# Patient Record
Sex: FEMALE | Race: BLACK OR AFRICAN AMERICAN | URBAN - METROPOLITAN AREA
[De-identification: names, ages, dates, MRNs, and addresses within clinical notes are randomized per-mention and may not be internally consistent; named-entity substitution may affect disease eponyms.]

---

## 2019-11-07 ENCOUNTER — OFFICE VISIT (OUTPATIENT)
Dept: FAMILY MEDICINE CLINIC | Age: 54
End: 2019-11-07

## 2019-11-07 VITALS
TEMPERATURE: 97.8 F | DIASTOLIC BLOOD PRESSURE: 85 MMHG | BODY MASS INDEX: 22.2 KG/M2 | HEIGHT: 64 IN | HEART RATE: 96 BPM | OXYGEN SATURATION: 99 % | SYSTOLIC BLOOD PRESSURE: 128 MMHG | RESPIRATION RATE: 14 BRPM | WEIGHT: 130 LBS

## 2019-11-07 DIAGNOSIS — H69.83 EUSTACHIAN TUBE DYSFUNCTION, BILATERAL: ICD-10-CM

## 2019-11-07 DIAGNOSIS — L21.9 SEBORRHEIC DERMATITIS OF SCALP: ICD-10-CM

## 2019-11-07 DIAGNOSIS — J31.0 RHINITIS, UNSPECIFIED TYPE: ICD-10-CM

## 2019-11-07 DIAGNOSIS — I10 ESSENTIAL HYPERTENSION: Primary | ICD-10-CM

## 2019-11-07 DIAGNOSIS — E89.0 POSTABLATIVE HYPOTHYROIDISM: ICD-10-CM

## 2019-11-07 RX ORDER — LEVOTHYROXINE SODIUM 75 UG/1
TABLET ORAL
COMMUNITY
End: 2019-11-07 | Stop reason: SDUPTHER

## 2019-11-07 RX ORDER — LEVOTHYROXINE SODIUM 88 UG/1
88 TABLET ORAL
Qty: 30 TAB | Refills: 0 | Status: SHIPPED | OUTPATIENT
Start: 2019-11-07 | End: 2019-12-12 | Stop reason: SDUPTHER

## 2019-11-07 RX ORDER — LEVOTHYROXINE SODIUM 88 UG/1
88 TABLET ORAL
COMMUNITY
End: 2019-11-07 | Stop reason: SDUPTHER

## 2019-11-07 RX ORDER — FLUTICASONE PROPIONATE 50 MCG
2 SPRAY, SUSPENSION (ML) NASAL DAILY
Qty: 1 BOTTLE | Refills: 3 | Status: SHIPPED | OUTPATIENT
Start: 2019-11-07 | End: 2019-12-12 | Stop reason: SDUPTHER

## 2019-11-07 RX ORDER — AMLODIPINE BESYLATE AND BENAZEPRIL HYDROCHLORIDE 10; 40 MG/1; MG/1
1 CAPSULE ORAL DAILY
Qty: 30 CAP | Refills: 5 | Status: SHIPPED | OUTPATIENT
Start: 2019-11-07 | End: 2019-12-12 | Stop reason: SDUPTHER

## 2019-11-07 RX ORDER — LEVOTHYROXINE SODIUM 75 UG/1
75 TABLET ORAL
Qty: 30 TAB | Refills: 0 | Status: SHIPPED | OUTPATIENT
Start: 2019-11-07 | End: 2019-12-12 | Stop reason: SDUPTHER

## 2019-11-07 RX ORDER — AMLODIPINE BESYLATE AND BENAZEPRIL HYDROCHLORIDE 10; 40 MG/1; MG/1
1 CAPSULE ORAL DAILY
COMMUNITY
End: 2019-11-07 | Stop reason: SDUPTHER

## 2019-11-07 NOTE — PROGRESS NOTES
1. Have you been to the ER, urgent care clinic since your last visit? Hospitalized since your last visit? No    2. Have you seen or consulted any other health care providers outside of the [address] [place] [address] since your last visit? Include any pap smears or colon screening.  No

## 2019-11-07 NOTE — PROGRESS NOTES
[facility]    CC: EOC    HPI:     HTN:  -Taking BP medication as prescribed  -Denies any side effects or issue  -No currently checking BP at home  -Not following any regular exercise regimen  -Diet is unrestricted      Hypothyroidism:  -Reports it is difficult to control  -Taking levothyroxine as prescribed  -Denies any side effects to medication      Bilateral Ear Discomfort:  -Issue started 3 years ago  -Thinks some thing got into her ear  -Associated with chronic ear infections and headaches      ROS: Positive items marked in RED  CON: fever, chills  Cardiovascular: palpitations, CP  Resp: SOB, cough  GI: nausea, vomiting, diarrhea  : dysuria, hematuria      Past Medical History:   Diagnosis Date    Hypertension     Hyperthyroidism     Hypothyroidism        Past Surgical History:   Procedure Laterality Date    HX HYSTERECTOMY  2010       Family History   Problem Relation Age of Onset    Hypertension Mother     Heart Disease Father        Social History     Socioeconomic History    Marital status:      Spouse name: Not on file    Number of children: Not on file    Years of education: Not on file    Highest education level: Not on file   Tobacco Use    Smoking status: Current Some Day Smoker     Types: Cigars    Smokeless tobacco: Never Used   Substance and Sexual Activity    Alcohol use: Yes     Alcohol/week: 1.0 standard drinks     Types: 1 Glasses of wine per week     Comment: ocasional    Drug use: Never    Sexual activity: Yes     Partners: Male     Birth control/protection: None       Allergies   Allergen Reactions    Peanuts [Peanut Oil] Anaphylaxis         Current Outpatient Medications:     amLODIPine-benazepril (LOTREL) 10-40 mg per capsule, Take 1 Cap by mouth daily. , Disp: , Rfl:     levothyroxine (SYNTHROID) 75 mcg tablet, Take  by mouth Daily (before breakfast).  Monday thru Friday, Disp: , Rfl:     levothyroxine (SYNTHROID) 88 mcg tablet, Take 88 mcg by mouth Daily (before breakfast).  Saturday and Sunday, Disp: , Rfl:     Physical Exam:      /85   Pulse 96   Temp 97.8 °F (36.6 °C) (Oral)   Resp 14   Ht 5' 4\" (1.626 m)   Wt 130 lb (59 kg)   SpO2 99%   BMI 22.31 kg/m²     General:  WD, WN, NAD, conversant  Eyes: sclera clear bilaterally, no discharge noted, eyelids normal in appearance  HENT: NCAT, TMs slightly retracted bilaterally, nasal turbinates enlarged bilaterally, oropharynx clear, MMM  Lungs: CTAB, normal respiratory effort and rate  CV: RRR, no MRGs  ABD: soft, non-tender, non-distended, normal bowel sounds  Skin: patches of skin on scalp with oily brown scaling/flakes  Psych: alert and oriented to person, place and situation, normal affect  Neuro: speech normal, moving all extremities, gait normal      Assessment/Plan     HTN:  -Goal BP unclear  -Will continue current BP medication regimen  -CBC, CMP, and fasting lipid panel  -F/U in one month      Hypothyroidism:  -Will continue current levothyroxine regimen  -TSH ordered  -F/U in one month      Seborrheic Dermatitis of Scalp:  -Counseled on diagnosis  -Started on pyrithione shampoo regimen  -Handout given on seborrheic dermatitis care  -F/U in one month      Bilateral Eustachian Tube Dysfunction:  -2/2 rhinitis (Suspect allergic etiology)  -Counseled on diagnosis and recommended care  -Started on Flonase regimen  -Allergen profile ordered  -Handouts given on rhinitis care and eustachian tube dysfunction care  -F/U in one month        Rigoberto Gamboa MD  11/7/2019, 1:52 PM

## 2019-11-07 NOTE — PATIENT INSTRUCTIONS
Eustachian Tube Problems: Care Instructions  Your Care Instructions    The eustachian (say \"you-STAY-shee-un\") tubes run between the inside of the ears and the throat. They keep air pressure stable in the ears. If your eustachian tubes become blocked, the air pressure in your ears changes. The fluids from a cold can clog eustachian tubes, causing pain in the ears. A quick change in air pressure can cause eustachian tubes to close up. This might happen when an airplane changes altitude or when a  goes up or down underwater. Eustachian tube problems often clear up on their own or after antibiotic treatment. If your tubes continue to be blocked, you may need surgery. Follow-up care is a key part of your treatment and safety. Be sure to make and go to all appointments, and call your doctor if you are having problems. It's also a good idea to know your test results and keep a list of the medicines you take. How can you care for yourself at home? · To ease ear pain, apply a warm washcloth or a heating pad set on low. There may be some drainage from the ear when the heat melts earwax. Put a cloth between the heat source and your skin. Do not use a heating pad with children. · If your doctor prescribed antibiotics, take them as directed. Do not stop taking them just because you feel better. You need to take the full course of antibiotics. · Your doctor may recommend over-the-counter medicine. Be safe with medicines. Oral or nasal decongestants may relieve ear pain. Avoid decongestants that are combined with antihistamines, which tend to cause more blockage. But if allergies seem to be the problem, your doctor may recommend a combination. Be careful with cough and cold medicines. Don't give them to children younger than 6, because they don't work for children that age and can even be harmful. For children 6 and older, always follow all the instructions carefully.  Make sure you know how much medicine to give and how long to use it. And use the dosing device if one is included. When should you call for help? Call your doctor now or seek immediate medical care if:    · You develop sudden, complete hearing loss.     · You have severe pain or feel dizzy.     · You have new or increasing pus or blood draining from your ear.     · You have redness, swelling, or pain around or behind the ear.    Watch closely for changes in your health, and be sure to contact your doctor if:    · You do not get better after 2 weeks.     · You have any new symptoms, such as itching or a feeling of fullness in the ear. Where can you learn more? Go to http://chikis-savanna.info/. Enter Y822 in the search box to learn more about \"Eustachian Tube Problems: Care Instructions. \"  Current as of: October 21, 2018  Content Version: 12.2  © 7158-1389 [employer]. Care instructions adapted under license by [facility] (which disclaims liability or warranty for this information). If you have questions about a medical condition or this instruction, always ask your healthcare professional. [place] [address] any warranty or liability for your use of this information. Rhinitis: Care Instructions  Your Care Instructions  Rhinitis is swelling and irritation in the nose. Allergies and infections are often the cause. Your nose may run or feel stuffy. Other symptoms are itchy and sore eyes, ears, throat, and mouth. If allergies are the cause, your doctor may do tests to find out what you are allergic to. You may be able to stop symptoms if you avoid the things that cause them. Your doctor may suggest or prescribe medicine to ease your symptoms. Follow-up care is a key part of your treatment and safety. Be sure to make and go to all appointments, and call your doctor if you are having problems. It's also a good idea to know your test results and keep a list of the medicines you take.   How can you care for yourself at home? · If your rhinitis is caused by allergies, try to find out what sets off (triggers) your symptoms. Take steps to avoid these triggers. ? Avoid yard work. It can stir up both pollen and mold. ? Do not smoke or allow others to smoke around you. If you need help quitting, talk to your doctor about stop-smoking programs and medicines. These can increase your chances of quitting for good. ? Do not use aerosol sprays, cleaning products, or perfumes. ? If pollen is one of your triggers, close your house and car windows during blooming season. ? Clean your house often to control dust.  ? Keep pets outside. · If your doctor recommends over-the-counter medicines to relieve symptoms, take your medicines exactly as prescribed. Call your doctor if you think you are having a problem with your medicine. · Use saline (saltwater) nasal washes to help keep your nasal passages open and wash out mucus and bacteria. You can buy saline nose drops at a grocery store or drugstore. Or you can make your own at home by adding 1 teaspoon of salt and 1 teaspoon of baking soda to 2 cups of distilled water. If you make your own, fill a bulb syringe with the solution, insert the tip into your nostril, and squeeze gently. Linda Lock your nose. When should you call for help? Call your doctor now or seek immediate medical care if:    · You are having trouble breathing.    Watch closely for changes in your health, and be sure to contact your doctor if:    · Mucus from your nose gets thicker (like pus) or has new blood in it.     · You have new or worse symptoms.     · You do not get better as expected. Where can you learn more? Go to http://chikis-savanna.info/. Enter M030 in the search box to learn more about \"Rhinitis: Care Instructions. \"  Current as of: October 21, 2018  Content Version: 12.2  © 9019-7155 [employer].  Care instructions adapted under license by [facility] [employer] (which disclaims liability or warranty for this information). If you have questions about a medical condition or this instruction, always ask your healthcare professional. [place] any warranty or liability for your use of this information. Seborrheic Dermatitis: Care Instructions  Your Care Instructions  Seborrheic dermatitis (say \"rjs-var-WLV-ick hdo-tly-LN-tus\") is a skin problem that causes a reddish rash with greasy, flaky, yellow skin patches. The rash may appear on many parts of the body. It may be on the scalp, face (especially the eyebrow area and between the nose and mouth), ears, breasts, underarms, and genital area. The flaky skin on the scalp is called dandruff. This rash is often a long-term (chronic) condition. It may last for years. But the symptoms may come and go. Symptoms can be treated with special creams, shampoos, or other skin care. The cause of seborrheic dermatitis is not fully understood. It may occur when skin glands make too much oil. It may get worse in cold weather or with stress. A type of skin fungus, or yeast, may also be linked with this condition. Follow-up care is a key part of your treatment and safety. Be sure to make and go to all appointments, and call your doctor if you are having problems. It's also a good idea to know your test results and keep a list of the medicines you take. How can you care for yourself at home? · If your doctor prescribes a steroid cream, dandruff shampoo, or antifungal cream or medicine, use it as directed. If your doctor prescribes other medicine, take it as directed. · Use a dandruff shampoo if seborrheic dermatitis affects your scalp. This includes Head & Shoulders, Sebulex, and Selsun Blue. You may need to try a few kinds of shampoo to find the one that works best for you. · To help with itching:  ? Use hydrocortisone cream. Follow the directions on the label. ? Use cold, wet cloths.   ? Take an over-the-counter antihistamine, such as diphenhydramine (Benadryl) or loratadine (Claritin). Read and follow all instructions on the label. When should you call for help? Call your doctor now or seek immediate medical care if:    · You have signs of infection, such as:  ? Increased pain, swelling, warmth, or redness. ? Red streaks leading from the rash. ? Pus draining from the rash. ? A fever.    Watch closely for changes in your health, and be sure to contact your doctor if:    · The rash gets worse or spreads to other parts of your body.     · You do not get better as expected. Where can you learn more? Go to http://chikis-savanna.info/. Enter N007 in the search box to learn more about \"Seborrheic Dermatitis: Care Instructions. \"  Current as of: April 1, 2019  Content Version: 12.2  © 6373-4578 [employer]. Care instructions adapted under license by [employer] (which disclaims liability or warranty for this information). If you have questions about a medical condition or this instruction, always ask your healthcare professional. [place] [address] any warranty or liability for your use of this information.

## 2019-11-20 DIAGNOSIS — L21.9 SEBORRHEIC DERMATITIS OF SCALP: Primary | ICD-10-CM

## 2019-11-20 RX ORDER — CICLOPIROX 1 G/100ML
SHAMPOO TOPICAL
Qty: 120 ML | Refills: 0 | Status: SHIPPED | OUTPATIENT
Start: 2019-11-20 | End: 2019-12-12 | Stop reason: SDUPTHER

## 2019-11-25 PROBLEM — E89.0 POSTABLATIVE HYPOTHYROIDISM: Status: ACTIVE | Noted: 2019-11-25

## 2019-12-08 LAB
A ALTERNATA IGE QN: <0.1 KU/L
A FUMIGATUS IGE QN: <0.1 KU/L
ALBUMIN SERPL-MCNC: 4.7 G/DL (ref 3.5–5.5)
ALBUMIN/GLOB SERPL: 1.9 {RATIO} (ref 1.2–2.2)
ALP SERPL-CCNC: 92 IU/L (ref 39–117)
ALT SERPL-CCNC: 13 IU/L (ref 0–32)
AMER ROACH IGE QN: <0.1 KU/L
AST SERPL-CCNC: 20 IU/L (ref 0–40)
BAHIA GRASS IGE QN: <0.1 KU/L
BASOPHILS # BLD AUTO: 0 X10E3/UL (ref 0–0.2)
BASOPHILS NFR BLD AUTO: 0 %
BERMUDA GRASS IGE QN: <0.1 KU/L
BILIRUB SERPL-MCNC: <0.2 MG/DL (ref 0–1.2)
BOXELDER IGE QN: <0.1 KU/L
BUN SERPL-MCNC: 9 MG/DL (ref 6–24)
BUN/CREAT SERPL: 14 (ref 9–23)
C HERBARUM IGE QN: <0.1 KU/L
CALCIUM SERPL-MCNC: 10 MG/DL (ref 8.7–10.2)
CAT DANDER IGE QN: <0.1 KU/L
CHLORIDE SERPL-SCNC: 101 MMOL/L (ref 96–106)
CHOLEST SERPL-MCNC: 200 MG/DL (ref 100–199)
CMN PIGWEED IGE QN: <0.1 KU/L
CO2 SERPL-SCNC: 25 MMOL/L (ref 20–29)
COMMON RAGWEED IGE QN: <0.1 KU/L
CREAT SERPL-MCNC: 0.63 MG/DL (ref 0.57–1)
D FARINAE IGE QN: <0.1 KU/L
D PTERONYSS IGE QN: <0.1 KU/L
DOG DANDER IGE QN: 0.11 KU/L
ENGL PLANTAIN IGE QN: <0.1 KU/L
EOSINOPHIL # BLD AUTO: 0.2 X10E3/UL (ref 0–0.4)
EOSINOPHIL NFR BLD AUTO: 2 %
ERYTHROCYTE [DISTWIDTH] IN BLOOD BY AUTOMATED COUNT: 13.8 % (ref 12.3–15.4)
GLOBULIN SER CALC-MCNC: 2.5 G/DL (ref 1.5–4.5)
GLUCOSE SERPL-MCNC: 86 MG/DL (ref 65–99)
HCT VFR BLD AUTO: 39.7 % (ref 34–46.6)
HDLC SERPL-MCNC: 68 MG/DL
HGB BLD-MCNC: 13.7 G/DL (ref 11.1–15.9)
IMM GRANULOCYTES # BLD AUTO: 0 X10E3/UL (ref 0–0.1)
IMM GRANULOCYTES NFR BLD AUTO: 0 %
INTERPRETATION, 910389: NORMAL
JOHNSON GRASS IGE QN: <0.1 KU/L
LDLC SERPL CALC-MCNC: 108 MG/DL (ref 0–99)
LONDON PLANE IGE QN: <0.1 KU/L
LYMPHOCYTES # BLD AUTO: 3.4 X10E3/UL (ref 0.7–3.1)
LYMPHOCYTES NFR BLD AUTO: 30 %
Lab: ABNORMAL
M RACEMOSUS IGE QN: <0.1 KU/L
MCH RBC QN AUTO: 33.2 PG (ref 26.6–33)
MCHC RBC AUTO-ENTMCNC: 34.5 G/DL (ref 31.5–35.7)
MCV RBC AUTO: 96 FL (ref 79–97)
MONOCYTES # BLD AUTO: 0.5 X10E3/UL (ref 0.1–0.9)
MONOCYTES NFR BLD AUTO: 5 %
MT JUNIPER IGE QN: <0.1 KU/L
MUGWORT IGE QN: <0.1 KU/L
NETTLE IGE QN: <0.1 KU/L
NEUTROPHILS # BLD AUTO: 7.1 X10E3/UL (ref 1.4–7)
NEUTROPHILS NFR BLD AUTO: 63 %
P NOTATUM IGE QN: <0.1 KU/L
PLATELET # BLD AUTO: 497 X10E3/UL (ref 150–450)
POTASSIUM SERPL-SCNC: 4.2 MMOL/L (ref 3.5–5.2)
PROT SERPL-MCNC: 7.2 G/DL (ref 6–8.5)
RBC # BLD AUTO: 4.13 X10E6/UL (ref 3.77–5.28)
S BOTRYOSUM IGE QN: <0.1 KU/L
SHEEP SORREL IGE QN: <0.1 KU/L
SILVER BIRCH IGE QN: <0.1 KU/L
SODIUM SERPL-SCNC: 144 MMOL/L (ref 134–144)
SWEET GUM IGE QN: <0.1 KU/L
TIMOTHY IGE QN: <0.1 KU/L
TRIGL SERPL-MCNC: 119 MG/DL (ref 0–149)
TSH SERPL DL<=0.005 MIU/L-ACNC: 0.48 UIU/ML (ref 0.45–4.5)
VLDLC SERPL CALC-MCNC: 24 MG/DL (ref 5–40)
WBC # BLD AUTO: 11.2 X10E3/UL (ref 3.4–10.8)
WHITE ELM IGE QN: <0.1 KU/L
WHITE HICKORY IGE QN: <0.1 KU/L
WHITE MULBERRY IGE QN: <0.1 KU/L
WHITE OAK IGE QN: <0.1 KU/L

## 2019-12-12 ENCOUNTER — OFFICE VISIT (OUTPATIENT)
Dept: FAMILY MEDICINE CLINIC | Age: 54
End: 2019-12-12

## 2019-12-12 VITALS
HEIGHT: 64 IN | WEIGHT: 131 LBS | TEMPERATURE: 97.9 F | DIASTOLIC BLOOD PRESSURE: 86 MMHG | OXYGEN SATURATION: 99 % | SYSTOLIC BLOOD PRESSURE: 134 MMHG | BODY MASS INDEX: 22.36 KG/M2 | HEART RATE: 74 BPM | RESPIRATION RATE: 16 BRPM

## 2019-12-12 DIAGNOSIS — H69.83 EUSTACHIAN TUBE DYSFUNCTION, BILATERAL: ICD-10-CM

## 2019-12-12 DIAGNOSIS — L21.9 SEBORRHEIC DERMATITIS OF SCALP: ICD-10-CM

## 2019-12-12 DIAGNOSIS — E78.5 HYPERLIPIDEMIA, UNSPECIFIED HYPERLIPIDEMIA TYPE: ICD-10-CM

## 2019-12-12 DIAGNOSIS — J30.89 NON-SEASONAL ALLERGIC RHINITIS, UNSPECIFIED TRIGGER: ICD-10-CM

## 2019-12-12 DIAGNOSIS — I10 HYPERTENSION WITH GOAL BLOOD PRESSURE LESS THAN 140/90: Primary | ICD-10-CM

## 2019-12-12 DIAGNOSIS — E89.0 POSTABLATIVE HYPOTHYROIDISM: ICD-10-CM

## 2019-12-12 RX ORDER — MINERAL OIL
180 ENEMA (ML) RECTAL DAILY
Qty: 90 TAB | Refills: 2 | Status: SHIPPED | OUTPATIENT
Start: 2019-12-12 | End: 2020-01-27 | Stop reason: ALTCHOICE

## 2019-12-12 RX ORDER — LEVOTHYROXINE SODIUM 88 UG/1
88 TABLET ORAL
Qty: 90 TAB | Refills: 1 | Status: SHIPPED | OUTPATIENT
Start: 2019-12-12 | End: 2020-09-25 | Stop reason: SDUPTHER

## 2019-12-12 RX ORDER — LEVOTHYROXINE SODIUM 75 UG/1
75 TABLET ORAL
Qty: 90 TAB | Refills: 1 | Status: SHIPPED | OUTPATIENT
Start: 2019-12-12 | End: 2020-09-25 | Stop reason: SDUPTHER

## 2019-12-12 RX ORDER — FLUTICASONE PROPIONATE 50 MCG
2 SPRAY, SUSPENSION (ML) NASAL DAILY
Qty: 1 BOTTLE | Refills: 3 | Status: SHIPPED | OUTPATIENT
Start: 2019-12-12 | End: 2020-11-17 | Stop reason: SDUPTHER

## 2019-12-12 RX ORDER — CICLOPIROX 1 G/100ML
SHAMPOO TOPICAL
Qty: 120 ML | Refills: 5 | Status: SHIPPED | OUTPATIENT
Start: 2019-12-12 | End: 2020-11-17 | Stop reason: SDUPTHER

## 2019-12-12 RX ORDER — AMLODIPINE BESYLATE AND BENAZEPRIL HYDROCHLORIDE 10; 40 MG/1; MG/1
1 CAPSULE ORAL DAILY
Qty: 90 CAP | Refills: 2 | Status: SHIPPED | OUTPATIENT
Start: 2019-12-12 | End: 2020-11-17 | Stop reason: SDUPTHER

## 2019-12-12 NOTE — PROGRESS NOTES
[facility]    CC: Follow-up for chronic disease management    HPI:     HTN:  -Got requested lab work  -Taking BP medication as prescribed  -Denies any side effects or issues with BP medication  -Not following a regular exercise regimen  -Diet is unchanged since prior visit       Hypothyroidism:  -Got requested lab work  -Taking Synthroid as prescribed  -Denies any side effects or issues with the medication       Seborrheic Dermatitis of Scalp:  -Declined to use pyrithione shampoo, so she was started on ciclopirox shampoo regimen  -Has been using ciclopirox shampoo as prescribed  -Denies any side effects or issues with pressure improved        Bilateral Eustachian Tube Dysfunction:  -Got requested lab work  -Continues to endorse odd sensation in mastoids that is distressing for her  -Has been using Flonase as prescribed  -Denies any side effects or issues with the medication      ROS: Positive items marked in RED  CON: fever, chills  Cardiovascular: palpitations, CP  Resp: SOB, cough  GI: nausea, vomiting, diarrhea  : dysuria, hematuria      Past Medical History:   Diagnosis Date    Hypertension     Hyperthyroidism     Hypothyroidism        Past Surgical History:   Procedure Laterality Date    HX HYSTERECTOMY  2010       Family History   Problem Relation Age of Onset    Hypertension Mother     Heart Disease Father        Social History     Socioeconomic History    Marital status:      Spouse name: Not on file    Number of children: Not on file    Years of education: Not on file    Highest education level: Not on file   Tobacco Use    Smoking status: Current Some Day Smoker     Types: Cigars    Smokeless tobacco: Never Used   Substance and Sexual Activity    Alcohol use:  Yes     Alcohol/week: 1.0 standard drinks     Types: 1 Glasses of wine per week     Comment: ocasional    Drug use: Never    Sexual activity: Yes     Partners: Male     Birth control/protection: None Allergies   Allergen Reactions    Peanuts [Peanut Oil] Anaphylaxis         Current Outpatient Medications:     amLODIPine-benazepril (LOTREL) 10-40 mg per capsule, Take 1 Cap by mouth daily. , Disp: 30 Cap, Rfl: 5    levothyroxine (SYNTHROID) 75 mcg tablet, Take 1 Tab by mouth Daily (before breakfast). Monday thru Friday, Disp: 30 Tab, Rfl: 0    levothyroxine (SYNTHROID) 88 mcg tablet, Take 1 Tab by mouth Daily (before breakfast). Saturday and Sunday, Disp: 30 Tab, Rfl: 0    fluticasone propionate (FLONASE) 50 mcg/actuation nasal spray, 2 Sprays by Both Nostrils route daily. , Disp: 1 Bottle, Rfl: 3    pyrithione zinc 1 % shampoo, Wash scalp twice per week  Indications: seborrheic dermatitis, Disp: 400 mL, Rfl: 2    ciclopirox (LOPROX) 1 % sham, Apply 5 mL to wet hair; lather, and leave on hair and scalp for 3 minutes; rinse. May use 10 mL for longer hair. Repeat twice weekly for 4 weeks; allow a minimum of 3 days between applications, Disp: 980 mL, Rfl: 0    Physical Exam:      /86   Pulse 74   Temp 97.9 °F (36.6 °C) (Oral)   Resp 16   Ht 5' 4\" (1.626 m)   Wt 131 lb (59.4 kg)   SpO2 99%   BMI 22.49 kg/m²     General:  WD, WN, NAD, conversant  Eyes: sclera clear bilaterally, no discharge noted, eyelids normal in appearance  HENT: NCAT, nasal turbinates enlarged bilaterally (issue appears to be mostly unchanged from prior visit)  Lungs: CTAB, normal respiratory effort and rate  CV: RRR, no MRGs  ABD: soft, non-tender, non-distended, normal bowel sounds  Skin: patches of skin on scalp with oily brown scaling/flakes are no longer present  Psych: alert and oriented to person, place and situation, normal affect  Neuro: speech normal, moving all extremities, gait normal    Results for Rick Bowman (MRN <[id number]>):   Ref.  Range 12/5/2019 09:13   WBC Latest Ref Range: 3.4 - 10.8 x10E3/uL 11.2 (H)   RBC Latest Ref Range: 3.77 - 5.28 x10E6/uL 4.13   HGB Latest Ref Range: 11.1 - 15.9 g/dL 13.7 HCT Latest Ref Range: 34.0 - 46.6 % 39.7   MCV Latest Ref Range: 79 - 97 fL 96   MCH Latest Ref Range: 26.6 - 33.0 pg 33.2 (H)   MCHC Latest Ref Range: 31.5 - 35.7 g/dL 34.5   RDW Latest Ref Range: 12.3 - 15.4 % 13.8   PLATELET Latest Ref Range: 150 - 450 x10E3/uL 497 (H)   NEUTROPHILS Latest Ref Range: Not Estab. % 63   Lymphocytes Latest Ref Range: Not Estab. % 30   MONOCYTES Latest Ref Range: Not Estab. % 5   EOSINOPHILS Latest Ref Range: Not Estab. % 2   BASOPHILS Latest Ref Range: Not Estab. % 0   IMMATURE GRANULOCYTES Latest Ref Range: Not Estab. % 0   ABS. NEUTROPHILS Latest Ref Range: 1.4 - 7.0 x10E3/uL 7.1 (H)   ABS. IMM. GRANS. Latest Ref Range: 0.0 - 0.1 x10E3/uL 0.0   Abs Lymphocytes Latest Ref Range: 0.7 - 3.1 x10E3/uL 3.4 (H)   ABS. MONOCYTES Latest Ref Range: 0.1 - 0.9 x10E3/uL 0.5   ABS. EOSINOPHILS Latest Ref Range: 0.0 - 0.4 x10E3/uL 0.2   ABS. BASOPHILS Latest Ref Range: 0.0 - 0.2 x10E3/uL 0.0   Sodium Latest Ref Range: 134 - 144 mmol/L 144   Potassium Latest Ref Range: 3.5 - 5.2 mmol/L 4.2   Chloride Latest Ref Range: 96 - 106 mmol/L 101   CO2 Latest Ref Range: 20 - 29 mmol/L 25   Glucose Latest Ref Range: 65 - 99 mg/dL 86   BUN Latest Ref Range: 6 - 24 mg/dL 9   Creatinine Latest Ref Range: 0.57 - 1.00 mg/dL 0.63   BUN/Creatinine ratio Latest Ref Range: 9 - 23  14   Calcium Latest Ref Range: 8.7 - 10.2 mg/dL 10.0   GFR est non-AA Latest Ref Range: >59 mL/min/1.73 102   GFR est AA Latest Ref Range: >59 mL/min/1.73 118   Bilirubin, total Latest Ref Range: 0.0 - 1.2 mg/dL <0.2   Protein, total Latest Ref Range: 6.0 - 8.5 g/dL 7.2   Albumin Latest Ref Range: 3.5 - 5.5 g/dL 4.7   A-G Ratio Latest Ref Range: 1.2 - 2.2  1.9   ALT (SGPT) Latest Ref Range: 0 - 32 IU/L 13   AST Latest Ref Range: 0 - 40 IU/L 20   Alk.  phosphatase Latest Ref Range: 39 - 117 IU/L 92   Triglyceride Latest Ref Range: 0 - 149 mg/dL 119   Cholesterol, total Latest Ref Range: 100 - 199 mg/dL 200 (H)   HDL Cholesterol Latest Ref Range: >39 mg/dL 68   VLDL, calculated Latest Ref Range: 5 - 40 mg/dL 24   LDL, calculated Latest Ref Range: 0 - 99 mg/dL 108 (H)   TSH Latest Ref Range: 0.450 - 4.500 uIU/mL 0.481       Allergen profile (12/5/2019):  Component Value Flag Ref Range Units Status   CLASS DESCRIPTION Comment     Final   D. pteronyssinus <0.10   Class 0 kU/L Final   D. farinae Mite <0.10   Class 0 kU/L Final   Cat Hair/Dander <0.10   Class 0 kU/L Final   Dog Hair/Dander 0.11  Abnormal   Class 0/I kU/L Final   Bermuda Grass <0.10   Class 0 kU/L Final   Mikie grass <0.10   Class 0 kU/L Final   [place]o Grass <0.10   Class 0 kU/L Final   B[place]ia Grass <0.10   Class 0 kU/L Final   [id number] COCKROACH, AMERICAN <0.10   Class 0 kU/L Final   Penicillium notatum <0.10   Class 0 kU/L Final   Cladosporium herbarum <0.10   Class 0 kU/L Final   Aspergillus fumigatus <0.10   Class 0 kU/L Final   Mucor racemosus <0.10   Class 0 kU/L Final   Alternaria tenuis <0.10   Class 0 kU/L Final   Stemphylium botryosum <0.10   Class 0 kU/L Final   [id number] COMMON SILVER BIRCH <0.10   Class 0 kU/L Final   [place] <0.10   Class 0 kU/L Final   American White Elm <0.10   Class 0 kU/L Final   Maple/Leslie <0.10   Class 0 kU/L Final   White [place] <0.10   Class 0 kU/L Final   [id number] MAPLE LEAF [place] <0.10   Class 0 kU/L Final   White [place] <0.10   Class 0 kU/L Final   Sweet Gum <0.10   Class 0 kU/L Final   M[place] <0.10   Class 0 kU/L Final   Ragweed, Short/Common <0.10   Class 0 kU/L Final   Mugwort <0.10   Class 0 kU/L Final   Plantain, English <0.10   Class 0 kU/L Final   Pigweed, Rough <0.10   Class 0 kU/L Final   Sheep Sorrel (Dock) <0.10   Class 0 kU/L Final   Nettle <0.10   Class 0 kU/L Final       Assessment/Plan     Hypertension, well controlled:  -BP at goal of less than 140/90  -10-year ASCVD risk calculated to be 8.4%  -Counseled on ASCVD risk and AHA/ACC recommendations  -Will continue current blood pressure medication regimen  -Follow-up in 1 month      HLD:  -10-year ASCVD risk calculated to be 8.4%  -Counseled on ASCVD risk and AHA/ACC recommendations  -Was strongly encouraged not to smoke  -Patient would like to manage with lifestyle changes  -Follow-up in 1 month      Hypothyroidism, well controlled:  -Will continue current levothyroxine regimen  -Follow-up in 1 month      Seborrheic Dermatitis of Scalp, Improving:  -Will continue current shampoo regimen  -Follow-up in 1 month       Bilateral Eustachian Tube Dysfunction:  -Likely 2/2 inadequately controlled allergic rhinitis  -Allergen test results reviewed with patient  -Started on Allegra regimen  -Will continue Flonase regimen  -May need to put on steroid taper if issue does not improve  -Follow-up in 1 month        Francisco Dailey MD  12/12/2019, 10:55 AM

## 2019-12-12 NOTE — PROGRESS NOTES
1. Have you been to the ER, urgent care clinic since your last visit? Hospitalized since your last visit? No    2. Have you seen or consulted any other health care providers outside of the [address] [place] [address] since your last visit? Include any pap smears or colon screening.  No

## 2019-12-31 PROBLEM — F17.290 CIGAR SMOKER: Status: ACTIVE | Noted: 2019-12-31

## 2020-01-27 ENCOUNTER — OFFICE VISIT (OUTPATIENT)
Dept: FAMILY MEDICINE CLINIC | Age: 55
End: 2020-01-27

## 2020-01-27 VITALS
HEIGHT: 64 IN | RESPIRATION RATE: 16 BRPM | SYSTOLIC BLOOD PRESSURE: 130 MMHG | BODY MASS INDEX: 22.71 KG/M2 | DIASTOLIC BLOOD PRESSURE: 87 MMHG | WEIGHT: 133 LBS | TEMPERATURE: 97.7 F | HEART RATE: 92 BPM | OXYGEN SATURATION: 96 %

## 2020-01-27 DIAGNOSIS — B85.2 LICE: Primary | ICD-10-CM

## 2020-01-27 DIAGNOSIS — Z91.010 PEANUT ALLERGY: ICD-10-CM

## 2020-01-27 DIAGNOSIS — J30.89 NON-SEASONAL ALLERGIC RHINITIS, UNSPECIFIED TRIGGER: ICD-10-CM

## 2020-01-27 DIAGNOSIS — L21.9 SEBORRHEIC DERMATITIS OF SCALP: ICD-10-CM

## 2020-01-27 RX ORDER — MALATHION 0 G/ML
LOTION TOPICAL
Qty: 59 ML | Refills: 1 | Status: SHIPPED | OUTPATIENT
Start: 2020-01-27

## 2020-01-27 RX ORDER — AZELASTINE 1 MG/ML
1 SPRAY, METERED NASAL 2 TIMES DAILY
Qty: 1 BOTTLE | Refills: 5 | Status: SHIPPED | OUTPATIENT
Start: 2020-01-27 | End: 2020-05-07 | Stop reason: SDUPTHER

## 2020-01-27 RX ORDER — AZELASTINE 1 MG/ML
1 SPRAY, METERED NASAL 2 TIMES DAILY
Qty: 1 BOTTLE | Refills: 5 | Status: SHIPPED | OUTPATIENT
Start: 2020-01-27 | End: 2020-01-27 | Stop reason: SDUPTHER

## 2020-01-27 RX ORDER — MALATHION 0 G/ML
LOTION TOPICAL
Qty: 59 ML | Refills: 0 | Status: SHIPPED | OUTPATIENT
Start: 2020-01-27 | End: 2020-01-27 | Stop reason: SDUPTHER

## 2020-01-27 NOTE — PROGRESS NOTES
No chief complaint on file. Pt preferred language for health care discussion is english. Is someone accompanying this pt? no    Is the patient using any DME equipment during [address]? no    Depression Screening:  3 most recent PHQ Screens 1/27/2020 1/27/2020 11/7/2019   Little interest or pleasure in doing things Not at all Not at all Not at all   Feeling down, depressed, irritable, or hopeless Not at all Not at all Not at all   Total Score PHQ 2 0 0 0       Learning Assessment:  Learning Assessment 11/7/2019   PRIMARY LEARNER Patient   HIGHEST LEVEL OF EDUCATION - PRIMARY LEARNER  SOME COLLEGE   BARRIERS PRIMARY LEARNER NONE   CO-LEARNER CAREGIVER No   PRIMARY LANGUAGE ENGLISH   LEARNER PREFERENCE PRIMARY DEMONSTRATION     PICTURES   ANSWERED BY patient   RELATIONSHIP SELF         Health Maintenance reviewed and discussed per provider. Yes        Advance Directive:  1. Do you have an advance directive in place? Patient Reply:no    2. If not, would you like material regarding how to put one in place? Patient Reply: no    Coordination of Care:  1. Have you been to the ER, urgent care clinic since your last visit? Hospitalized since your last visit? no    2. Have you seen or consulted any other health care providers outside of the [address] [place] [address] since your last visit? Include any pap smears or colon screening.  no    Provider prefers to do his own med reconciliation

## 2020-01-27 NOTE — PATIENT INSTRUCTIONS
Head Lice: Care Instructions  Your Care Instructions    Head lice are tiny bugs that can live in your hair and on your head. Live lice are tan to grayish white. They're about the size of a sesame seed. It may be easiest to find them at the base of the scalp, at the bottom of the neck, and behind the ears. When you have lice, all people living in your home need to be carefully checked and then treated. Lice eggs (nits) may be easier to see than live lice. They look like tiny yellow or white dots attached to the hair, close to the scalp. They're often easier to see than live lice. Nits can look like dandruff. But you can't pick them off with your fingernail or brush them away. Lice aren't dangerous. They don't spread disease or have anything to do with how clean someone is. The lice may make your head itch. Lice won't go away without treatment. You can treat lice and their eggs with prescription or over-the-counter medicines. After treatment, your skin may still itch for a week or more. This is because of your body's reaction to the lice. Follow-up care is a key part of your treatment and safety. Be sure to make and go to all appointments, and call your doctor if you are having problems. It's also a good idea to know your test results and keep a list of the medicines you take. How can you care for yourself at home? · Use an over-the-counter medicine to kill lice. It's important to use any medicine correctly and to choose a medicine that is safe. Talk to your doctor or pharmacist if you have questions. · Check your scalp for live lice 48 hours after treatment. If you find some, try a different type of treatment. It may be that the lice in your area are resistant to the first treatment you tried. · Check your scalp again 7 to 10 days after the first treatment. If you find live lice, you need a second treatment.  This is to make sure that all lice are killed, including those that hatched since the first treatment. · Try not to scratch. It may help to use an over-the-counter cream or calamine lotion to calm the itching. If the itching is really bad, ask the doctor about an over-the-counter antihistamine, such as diphenhydramine (Benadryl) or loratadine (Claritin). Read and follow all instructions on the label. · You may want to remove nits after treatment, but you don't have to remove them all. Some people use a special comb to remove nits after using lice medicine. The luz are often packaged with over-the-counter lice shampoos. A flea comb that's made for dogs and cats will also work. · Try not to share anything that comes into contact with hair. For example, don't share hair bands, barrettes, towels, hats, luz, or brushes. Teach your children not to share anything that comes into contact with hair if they have lice. · You don't need to spend a lot of time or money deep cleaning your home. But it is a good idea to:  ? Soak hairbrushes, luz, barrettes, and other items for 10 minutes in hot water (at least 130°F). ? Vacuum carpets, mattresses, couches, and other fabric-covered furniture. ? Machine-wash clothes, bedding, towels, and hats in hot water (at least 130°F). Dry them in a hot dryer. If you don't have access to a washing machine, instead you can store these items in a sealed plastic bag for 14 days. When should you call for help? Call your doctor now or seek immediate medical care if:    · You have signs of a skin infection, such as:  ? Increased pain, swelling, warmth, and redness. ? Red streaks coming from an area of the scalp. ? Pus draining from the area. ? A fever.    Watch closely for changes in your health, and be sure to contact your doctor if:    · You see live lice or new nits after you have followed the directions for your medicine.     · Anyone else in your family has lice.     · You do not get better as expected. Where can you learn more?   Go to http://chikis-savanna.info/. Enter R349 in the search box to learn more about \"Head Lice: Care Instructions. \"  Current as of: April 1, 2019  Content Version: 12.2  © 6356-1692 [employer]. Care instructions adapted under license by [employer] (which disclaims liability or warranty for this information). If you have questions about a medical condition or this instruction, always ask your healthcare professional. [address] any warranty or liability for your use of this information.

## 2020-01-27 NOTE — PROGRESS NOTES
[facility]    CC: F/U of     HPI:     Seborrheic Dermatitis of Scalp:  -Using shampoo as prescribed  -Denies any side effects or issues with the medications  -Reports no change in symptoms       Scalp Discomfort:  -States that she does not think issues in head are due to her dermatitis  -States she feels like something is crawling around inside her head  -Reports that she has seen the flakes from her scalp moving on their own      Allergic Rhinitis:  -Has had her carpet cleaned  -Taking Flonase as prescribed  -Denies any side effects or issues with the medication  -Reports no change in symptoms      ROS: Positive items marked in RED  CON: fever, chills  Cardiovascular: palpitations, CP  Resp: SOB, cough  GI: nausea, vomiting, diarrhea  : dysuria, hematuria      Past Medical History:   Diagnosis Date    Hypertension     Hyperthyroidism     Hypothyroidism        Past Surgical History:   Procedure Laterality Date    HX HYSTERECTOMY  2010       Family History   Problem Relation Age of Onset    Hypertension Mother     Heart Disease Father        Social History     Socioeconomic History    Marital status:      Spouse name: Not on file    Number of children: Not on file    Years of education: Not on file    Highest education level: Not on file   Tobacco Use    Smoking status: Current Some Day Smoker     Types: Cigars    Smokeless tobacco: Never Used   Substance and Sexual Activity    Alcohol use: Yes     Alcohol/week: 1.0 standard drinks     Types: 1 Glasses of wine per week     Comment: ocasional    Drug use: Never    Sexual activity: Yes     Partners: Male     Birth control/protection: None       Allergies   Allergen Reactions    Peanuts [Peanut Oil] Anaphylaxis         Current Outpatient Medications:     levothyroxine (SYNTHROID) 75 mcg tablet, Take 1 Tab by mouth Daily (before breakfast).  Monday thru Friday, Disp: 90 Tab, Rfl: 1    ciclopirox (LOPROX) 1 % sham, Apply 5 mL to wet hair; lather, and leave on hair and scalp for 3 minutes; rinse. May use 10 mL for longer hair. Repeat twice weekly for 4 weeks; allow a minimum of 3 days between applications, Disp: 841 mL, Rfl: 5    fluticasone propionate (FLONASE) 50 mcg/actuation nasal spray, 2 Sprays by Both Nostrils route daily. , Disp: 1 Bottle, Rfl: 3    amLODIPine-benazepril (LOTREL) 10-40 mg per capsule, Take 1 Cap by mouth daily. , Disp: 90 Cap, Rfl: 2    levothyroxine (SYNTHROID) 88 mcg tablet, Take 1 Tab by mouth Daily (before breakfast). Saturday and Sunday, Disp: 90 Tab, Rfl: 1    fexofenadine (ALLEGRA) 180 mg tablet, Take 1 Tab by mouth daily. , Disp: 90 Tab, Rfl: 2    Physical Exam:      /87 (BP 1 Location: Left arm, BP Patient Position: Sitting)   Pulse 92   Temp 97.7 °F (36.5 °C) (Oral)   Resp 16   Ht 5' 4\" (1.626 m)   Wt 133 lb (60.3 kg)   SpO2 96%   BMI 22.83 kg/m²     General:  WD, WN, NAD, conversant  Eyes: sclera clear bilaterally, no discharge noted, eyelids normal in appearance  HENT: NCAT, notable amount of small white flakes in hair, small patch of erythematous skin noted on scalp, lone small insect briefly noted crawling in hair (Unable to get clear look at insect), nasal turbinates enlarged bilaterally  Lungs: CTAB, normal respiratory effort and rate  CV: RRR, no MRGs  ABD: soft, non-tender, non-distended, normal bowel sounds  Skin: normal temperature, turgor, color, and texture  Psych: alert and oriented to person, place and situation, normal affect  Neuro: speech normal, moving all extremities, gait normal      Assessment/Plan     Lice:  -Possible type of insect that was briefly seen in scalp (Was not able to find on repeat examination).  Some of the white flakes noted on exam maybe nits  -DDx includes parasitosis  -Will start on malathion regimen as a precaution  -Handout given on head lice care  -F/U in one month      Seborrheic Dermatitis of Scalp, Improving:  -Likely cause of scalp discomfort  -Will continue current shampoo regimen  -Follow-up in 1 month      Allergic Rhinitis.  Unchanged:  -Allegra regimen discontinued  -Started on azelastine regimen  -Follow-up in 1 month        Jennifer To MD  1/27/2020, 1:35 PM

## 2020-02-07 ENCOUNTER — OFFICE VISIT (OUTPATIENT)
Dept: FAMILY MEDICINE CLINIC | Age: 55
End: 2020-02-07

## 2020-02-07 VITALS
DIASTOLIC BLOOD PRESSURE: 75 MMHG | OXYGEN SATURATION: 99 % | WEIGHT: 133 LBS | SYSTOLIC BLOOD PRESSURE: 124 MMHG | HEART RATE: 71 BPM | BODY MASS INDEX: 22.71 KG/M2 | RESPIRATION RATE: 12 BRPM | HEIGHT: 64 IN | TEMPERATURE: 97.9 F

## 2020-02-07 DIAGNOSIS — R51.9 CHRONIC INTRACTABLE HEADACHE, UNSPECIFIED HEADACHE TYPE: Primary | ICD-10-CM

## 2020-02-07 DIAGNOSIS — G89.29 CHRONIC INTRACTABLE HEADACHE, UNSPECIFIED HEADACHE TYPE: Primary | ICD-10-CM

## 2020-02-07 NOTE — PROGRESS NOTES
1. Have you been to the ER, urgent care clinic since your last visit? Hospitalized since your last visit? No    2. Have you seen or consulted any other health care providers outside of the [address] [place] [address] since your last visit? Include any pap smears or colon screening.  No

## 2020-02-07 NOTE — PROGRESS NOTES
[facility]    CC: Follow-up of Headache    HPI:     Headache:  -Has been taking prescribed allergy medication  and using medication for possible lice infection  -Reports issue is unchanged since last visit  -States that she feels like she has a parasitic infection  -Comments that she has seen her flakes move on their own and seen them going back into her body  -Reports in the past being told she has parasitosis  -Wishes to be evaluated by neurologist for issue      ROS: Positive items marked in RED  CON: fever, chills  Cardiovascular: palpitations, CP  Resp: SOB, cough  GI: nausea, vomiting, diarrhea  : dysuria, hematuria      Past Medical History:   Diagnosis Date    Hypertension     Hyperthyroidism     Hypothyroidism        Past Surgical History:   Procedure Laterality Date    HX HYSTERECTOMY  2010       Family History   Problem Relation Age of Onset    Hypertension Mother     Heart Disease Father        Social History     Socioeconomic History    Marital status:      Spouse name: Not on file    Number of children: Not on file    Years of education: Not on file    Highest education level: Not on file   Tobacco Use    Smoking status: Current Some Day Smoker     Types: Cigars    Smokeless tobacco: Never Used   Substance and Sexual Activity    Alcohol use: Yes     Alcohol/week: 1.0 standard drinks     Types: 1 Glasses of wine per week     Comment: ocasional    Drug use: Never    Sexual activity: Yes     Partners: Male     Birth control/protection: None       Allergies   Allergen Reactions    Peanuts [Peanut Oil] Anaphylaxis         Current Outpatient Medications:     azelastine (ASTELIN) 137 mcg (0.1 %) nasal spray, 1 [place] by Both Nostrils route two (2) times a day.  Use in each nostril as directed, Disp: 1 Bottle, Rfl: 5    malathion (OVIDE) 0.5 % lotion, Apply sufficient amount to cover and thoroughly moisten dry hair and scalp; allow hair to dry and shampoo after 8 to 12 hours. If required, repeat with second application in 7 days, Disp: 59 mL, Rfl: 1    levothyroxine (SYNTHROID) 75 mcg tablet, Take 1 Tab by mouth Daily (before breakfast). Monday thru Friday, Disp: 90 Tab, Rfl: 1    ciclopirox (LOPROX) 1 % sham, Apply 5 mL to wet hair; lather, and leave on hair and scalp for 3 minutes; rinse. May use 10 mL for longer hair. Repeat twice weekly for 4 weeks; allow a minimum of 3 days between applications, Disp: 170 mL, Rfl: 5    fluticasone propionate (FLONASE) 50 mcg/actuation nasal spray, 2 Sprays by Both Nostrils route daily. , Disp: 1 Bottle, Rfl: 3    amLODIPine-benazepril (LOTREL) 10-40 mg per capsule, Take 1 Cap by mouth daily. , Disp: 90 Cap, Rfl: 2    levothyroxine (SYNTHROID) 88 mcg tablet, Take 1 Tab by mouth Daily (before breakfast).  Saturday and Sunday, Disp: 90 Tab, Rfl: 1    Physical Exam:      /75   Pulse 71   Temp 97.9 °F (36.6 °C) (Oral)   Resp 12   Ht 5' 4\" (1.626 m)   Wt 133 lb (60.3 kg)   SpO2 99%   BMI 22.83 kg/m²     General:  WD, WN, NAD, conversant  Eyes: sclera clear bilaterally, no discharge noted, eyelids normal in appearance  HENT: NCAT  Lungs: CTAB, normal respiratory effort and rate  CV: RRR, no MRGs  ABD: soft, non-tender, non-distended, normal bowel sounds  Skin: normal temperature, turgor, color, and texture  Psych: alert and oriented to person, place and situation, normal affect  Neuro: speech normal, moving all extremities, gait normal      Assessment/Plan     Chronic Headache:  -Patient reassured that her eosinophil count was not consistent with a parasitic infection and that parasites in the GI tract do not come out of the scalp  -Advised that her scalp discomfort was likely due to her seborrheic dermatitis and sinus pressure was secondary to her allergic rhinitis  -Referred to neurology for further evaluation/recommendations  -F/U in one month        Mariella Morse MD  2/7/2020, 2:53 PM

## 2020-02-09 RX ORDER — EPINEPHRINE 0.3 MG/.3ML
0.3 INJECTION SUBCUTANEOUS
Qty: 1 SYRINGE | Refills: 1 | Status: SHIPPED | OUTPATIENT
Start: 2020-02-09 | End: 2020-02-09

## 2020-05-07 ENCOUNTER — VIRTUAL VISIT (OUTPATIENT)
Dept: FAMILY MEDICINE CLINIC | Age: 55
End: 2020-05-07

## 2020-05-07 DIAGNOSIS — J30.89 NON-SEASONAL ALLERGIC RHINITIS, UNSPECIFIED TRIGGER: ICD-10-CM

## 2020-05-07 RX ORDER — OXYMETAZOLINE HCL 0.05 %
2 SPRAY, NON-AEROSOL (ML) NASAL 2 TIMES DAILY
Qty: 1 EACH | Refills: 0 | Status: SHIPPED | OUTPATIENT
Start: 2020-05-07 | End: 2020-05-10

## 2020-05-07 RX ORDER — AZELASTINE 1 MG/ML
1 SPRAY, METERED NASAL 2 TIMES DAILY
Qty: 1 BOTTLE | Refills: 5 | Status: SHIPPED | OUTPATIENT
Start: 2020-05-07

## 2020-05-07 NOTE — PROGRESS NOTES
[facility]    CC: Congestion    HPI:     Consent: Nena Lamb, who was seen by synchronous (real-time) audio-video technology, and/or her healthcare decision maker, is aware that this patient-initiated, Telehealth encounter on 5/7/2020 is a billable service, with coverage as determined by her insurance carrier. She is aware that she may receive a bill and has provided verbal consent to proceed: Yes. Congestion:  - Timing/onset: Reports 3 weeks ago she had a worsening of her baseline congestion  - Duration: Constant  - Location: Nasal  - Progression/Course: Worsening  - Associated Symptoms/signs: Runny nose and sinus pressure  - Tried: Flonase  - Context: Has not been using prescribed Azelastine for her allergic rhinitis      ROS: Positive items marked in RED  CON: fever, chills  Cardiovascular: palpitations, CP  Resp: SOB, cough (2/2 congestion)  GI: nausea, vomiting, diarrhea  : dysuria, hematuria      Past Medical History:   Diagnosis Date    Hypertension     Hyperthyroidism     Hypothyroidism        Past Surgical History:   Procedure Laterality Date    HX HYSTERECTOMY  2010       Family History   Problem Relation Age of Onset    Hypertension Mother     Heart Disease Father        Social History     Socioeconomic History    Marital status:      Spouse name: Not on file    Number of children: Not on file    Years of education: Not on file    Highest education level: Not on file   Tobacco Use    Smoking status: Current Some Day Smoker     Types: Cigars    Smokeless tobacco: Never Used   Substance and Sexual Activity    Alcohol use:  Yes     Alcohol/week: 1.0 standard drinks     Types: 1 Glasses of wine per week     Comment: ocasional    Drug use: Never    Sexual activity: Yes     Partners: Male     Birth control/protection: None       Allergies   Allergen Reactions    Peanuts [Peanut Oil] Anaphylaxis         Current Outpatient Medications:     azelastine (ASTELIN) 137 mcg (0.1 %) nasal spray, 1 [place] by Both Nostrils route two (2) times a day. Use in each nostril as directed, Disp: 1 Bottle, Rfl: 5    levothyroxine (SYNTHROID) 75 mcg tablet, Take 1 Tab by mouth Daily (before breakfast). Monday thru Friday, Disp: 90 Tab, Rfl: 1    ciclopirox (LOPROX) 1 % sham, Apply 5 mL to wet hair; lather, and leave on hair and scalp for 3 minutes; rinse. May use 10 mL for longer hair. Repeat twice weekly for 4 weeks; allow a minimum of 3 days between applications, Disp: 944 mL, Rfl: 5    fluticasone propionate (FLONASE) 50 mcg/actuation nasal spray, 2 Sprays by Both Nostrils route daily. , Disp: 1 Bottle, Rfl: 3    amLODIPine-benazepril (LOTREL) 10-40 mg per capsule, Take 1 Cap by mouth daily. , Disp: 90 Cap, Rfl: 2    levothyroxine (SYNTHROID) 88 mcg tablet, Take 1 Tab by mouth Daily (before breakfast). Saturday and Sunday, Disp: 90 Tab, Rfl: 1    malathion (OVIDE) 0.5 % lotion, Apply sufficient amount to cover and thoroughly moisten dry hair and scalp; allow hair to dry and shampoo after 8 to 12 hours.  If required, repeat with second application in 7 days, Disp: 59 mL, Rfl: 1    Physical Exam:      General: WD, WN, NAD, conversant  Eyes: sclera clear bilaterally, no discharge noted, eyelids normal in appearance, EOMI  HENT: NCAT  Neck: no masses visualized, trachea appears to be midline  Lungs: normal respiratory effort and rate, No visualized signs of difficulty breathing or respiratory distress  MS: normal AROM of neck noted  Skin: no significant exanthematous lesions or discoloration noted on neck/facial skin    Psych: alert and oriented to person, place and situation, normal affect  Neuro: speech normal, moving all upper extremities, no gaze palsy, no facial asymmetry (Cranial nerve 7 motor function) (limited exam due to video visit)      Assessment/Plan     Allergic Rhinitis, inadequately controlled:  -Likely worsening due to increasing pollen count  -Resumed on previously ordered azelastine regimen  -Started on short course of Afrin (Instructed not to exceed 3 days of use)  -Follow-up at already scheduled appointment        Linda Baig is a 47 y.o. female being evaluated by a Virtual Visit (video visit) encounter to address concerns as mentioned above. A caregiver was present when appropriate. Due to this being a TeleHealth encounter (During [facility]- public health emergency), evaluation of the following organ systems was limited: Vitals/Constitutional/EENT/Resp/CV/GI//MS/Neuro/Skin/Heme-Lymph-Imm. Pursuant to the emergency declaration under the [facility], [address] [place] [address] authority and the [employer] and [employer] Act, this Virtual Visit was conducted with patient's (and/or legal guardian's) consent, to reduce the risk of exposure to COVID-19 and provide necessary medical care. Services were provided through a video synchronous discussion virtually to substitute for in-person encounter. --Thiago Willingham MD on 5/7/2020 at 10:51 AM    An electronic signature was used to authenticate this note.

## 2020-05-07 NOTE — PROGRESS NOTES
1. Have you been to the ER, urgent care clinic since your last visit? Hospitalized since your last visit? No    2. Have you seen or consulted any other health care providers outside of the [address] [place] [address] since your last visit? Include any pap smears or colon screening.  No     Patient had an appointment with Neurology in April 2020 it was postponed by Neurology office until May 29, 2020

## 2020-09-23 ENCOUNTER — VIRTUAL VISIT (OUTPATIENT)
Dept: FAMILY MEDICINE CLINIC | Age: 55
End: 2020-09-23

## 2020-09-23 DIAGNOSIS — E89.0 POSTABLATIVE HYPOTHYROIDISM: ICD-10-CM

## 2020-09-23 RX ORDER — LEVOTHYROXINE SODIUM 75 UG/1
75 TABLET ORAL
Qty: 90 TAB | Refills: 1 | Status: CANCELLED | OUTPATIENT
Start: 2020-09-23

## 2020-09-23 RX ORDER — LEVOTHYROXINE SODIUM 88 UG/1
88 TABLET ORAL
Qty: 90 TAB | Refills: 1 | Status: CANCELLED | OUTPATIENT
Start: 2020-09-23

## 2020-09-23 NOTE — PROGRESS NOTES
1. Have you been to the ER, urgent care clinic since your last visit? Hospitalized since your last visit? No    2. Have you seen or consulted any other health care providers outside of the [address] [place] [address] since your last visit? Include any pap smears or colon screening. No      This encounter was created in error - please disregard.

## 2020-09-25 ENCOUNTER — VIRTUAL VISIT (OUTPATIENT)
Dept: FAMILY MEDICINE CLINIC | Age: 55
End: 2020-09-25

## 2020-09-25 DIAGNOSIS — J01.10 ACUTE FRONTAL SINUSITIS, RECURRENCE NOT SPECIFIED: Primary | ICD-10-CM

## 2020-09-25 DIAGNOSIS — E89.0 POSTABLATIVE HYPOTHYROIDISM: ICD-10-CM

## 2020-09-25 PROCEDURE — 99213 OFFICE O/P EST LOW 20 MIN: CPT | Performed by: FAMILY MEDICINE

## 2020-09-25 RX ORDER — LEVOTHYROXINE SODIUM 75 UG/1
75 TABLET ORAL
Qty: 90 TAB | Refills: 0 | Status: SHIPPED | OUTPATIENT
Start: 2020-09-25 | End: 2020-12-07 | Stop reason: DRUGHIGH

## 2020-09-25 RX ORDER — PSEUDOEPHEDRINE HYDROCHLORIDE 60 MG/1
60 TABLET ORAL
Qty: 24 TAB | Refills: 1 | Status: SHIPPED | OUTPATIENT
Start: 2020-09-25

## 2020-09-25 RX ORDER — LEVOTHYROXINE SODIUM 88 UG/1
88 TABLET ORAL
Qty: 90 TAB | Refills: 0 | Status: SHIPPED | OUTPATIENT
Start: 2020-09-25 | End: 2020-12-07 | Stop reason: DRUGHIGH

## 2020-09-25 NOTE — PROGRESS NOTES
[facility]    CC: F/U of Headaches    HPI:     Amanda Mccormick, who was evaluated through a synchronous (real-time) audio-video encounter, and/or her healthcare decision maker, is aware that it is a billable service, with coverage as determined by her insurance carrier. She provided verbal consent to proceed: Yes, and patient identification was verified. It was conducted pursuant to the emergency declaration under the [address] [place] [address] and the [employer] and [facility] Act. A caregiver was present when appropriate. Ability to conduct physical exam was limited. I was at home. The patient was at home. Headaches:  -Saw neurology  -States she was told her issue with headache was due to circulation  -CT scan was done and she was started on Topamax  -Has not started taking the Topamax regimen  -Reports that she has not scheduled a follow-up appointment with the neurologist  -Has been dealing with severe nasal congestion for the past 3 weeks  -Endorses having an associated frontal headache currently      ROS: Positive items marked in RED  CON: fever, chills  Cardiovascular: palpitations, CP  Resp: SOB, cough  GI: nausea, vomiting, diarrhea  : dysuria, hematuria      Past Medical History:   Diagnosis Date    Hypertension     Hyperthyroidism     Hypothyroidism        Past Surgical History:   Procedure Laterality Date    HX HYSTERECTOMY  2010       Family History   Problem Relation Age of Onset    Hypertension Mother     Heart Disease Father        Social History     Tobacco Use    Smoking status: Current Some Day Smoker     Types: Cigars    Smokeless tobacco: Never Used   Substance Use Topics    Alcohol use:  Yes     Alcohol/week: 1.0 standard drinks     Types: 1 Glasses of wine per week     Comment: ocasional    Drug use: Never       Allergies   Allergen Reactions    Peanuts [Peanut Oil] Anaphylaxis Current Outpatient Medications:     azelastine (ASTELIN) 137 mcg (0.1 %) nasal spray, 1 [place] by Both Nostrils route two (2) times a day. Use in each nostril as directed, Disp: 1 Bottle, Rfl: 5    malathion (OVIDE) 0.5 % lotion, Apply sufficient amount to cover and thoroughly moisten dry hair and scalp; allow hair to dry and shampoo after 8 to 12 hours. If required, repeat with second application in 7 days, Disp: 59 mL, Rfl: 1    levothyroxine (SYNTHROID) 75 mcg tablet, Take 1 Tab by mouth Daily (before breakfast). Monday thru Friday, Disp: 90 Tab, Rfl: 1    ciclopirox (LOPROX) 1 % sham, Apply 5 mL to wet hair; lather, and leave on hair and scalp for 3 minutes; rinse. May use 10 mL for longer hair. Repeat twice weekly for 4 weeks; allow a minimum of 3 days between applications, Disp: 509 mL, Rfl: 5    fluticasone propionate (FLONASE) 50 mcg/actuation nasal spray, 2 Sprays by Both Nostrils route daily. , Disp: 1 Bottle, Rfl: 3    amLODIPine-benazepril (LOTREL) 10-40 mg per capsule, Take 1 Cap by mouth daily. , Disp: 90 Cap, Rfl: 2    levothyroxine (SYNTHROID) 88 mcg tablet, Take 1 Tab by mouth Daily (before breakfast).  Saturday and Sunday, Disp: 90 Tab, Rfl: 1    Physical Exam:      General: WD, WN, NAD, conversant  Eyes: sclera clear bilaterally, no discharge noted, eyelids normal in appearance, EOMI  HENT: NCAT  Neck: no masses visualized, trachea appears to be midline  Lungs: normal respiratory effort and rate, No visualized signs of difficulty breathing or respiratory distress  MS: normal AROM of neck noted  Skin: no significant exanthematous lesions or discoloration noted on neck/facial skin    Psych: alert and oriented to person, place and situation, normal affect  Neuro: Hyponasal speech, moving all upper extremities, no gaze palsy, no facial asymmetry (Cranial nerve 7 motor function) (limited exam due to video visit)      Assessment/Plan     Acute Frontal Sinusitis:  -Advised to use previously prescribed Afrin for issue (No more than 3 days)  -Started on Sudafed regimen  -Recommended she follow-up with neurologist to discuss her CT results  -Need to obtain records from neurologist for review  -Follow-up in 1 week      Tee Hernández is a 54 y.o. female being evaluated by a Virtual Visit (video visit) encounter to address concerns as mentioned above. A caregiver was present when appropriate. Due to this being a TeleHealth encounter (During [facility]-20 public health emergency), evaluation of the following organ systems was limited: Vitals/Constitutional/EENT/Resp/CV/GI//MS/Neuro/Skin/Heme-Lymph-Imm. Pursuant to the emergency declaration under the [address] [place] [address] authority and the [employer] and [employer] Act, this Virtual Visit was conducted with patient's (and/or legal guardian's) consent, to reduce the risk of exposure to COVID-19 and provide necessary medical care. Services were provided through a video synchronous discussion virtually to substitute for in-person encounter. --Brittany Lambert MD on 9/25/2020 at 11:41 AM    An electronic signature was used to authenticate this note.

## 2020-09-25 NOTE — PROGRESS NOTES
1. Have you been to the ER, urgent care clinic since your last visit? Hospitalized since your last visit? No    2. Have you seen or consulted any other health care providers outside of the [address] [place] [address] since your last visit? Include any pap smears or colon screening.  No

## 2020-11-17 ENCOUNTER — VIRTUAL VISIT (OUTPATIENT)
Dept: FAMILY MEDICINE CLINIC | Age: 55
End: 2020-11-17
Payer: OTHER GOVERNMENT

## 2020-11-17 DIAGNOSIS — I10 HYPERTENSION WITH GOAL BLOOD PRESSURE LESS THAN 140/90: ICD-10-CM

## 2020-11-17 DIAGNOSIS — J30.89 NON-SEASONAL ALLERGIC RHINITIS, UNSPECIFIED TRIGGER: ICD-10-CM

## 2020-11-17 DIAGNOSIS — L21.9 SEBORRHEIC DERMATITIS OF SCALP: ICD-10-CM

## 2020-11-17 DIAGNOSIS — E78.5 HYPERLIPIDEMIA, UNSPECIFIED HYPERLIPIDEMIA TYPE: ICD-10-CM

## 2020-11-17 DIAGNOSIS — Z11.59 NEED FOR HEPATITIS C SCREENING TEST: ICD-10-CM

## 2020-11-17 DIAGNOSIS — J01.10 ACUTE FRONTAL SINUSITIS, RECURRENCE NOT SPECIFIED: ICD-10-CM

## 2020-11-17 DIAGNOSIS — E03.9 HYPOTHYROIDISM, UNSPECIFIED TYPE: Primary | ICD-10-CM

## 2020-11-17 PROCEDURE — 99214 OFFICE O/P EST MOD 30 MIN: CPT | Performed by: FAMILY MEDICINE

## 2020-11-17 RX ORDER — AMLODIPINE BESYLATE AND BENAZEPRIL HYDROCHLORIDE 10; 40 MG/1; MG/1
1 CAPSULE ORAL DAILY
Qty: 90 CAP | Refills: 2 | Status: SHIPPED | OUTPATIENT
Start: 2020-11-17

## 2020-11-17 RX ORDER — CICLOPIROX 1 G/100ML
SHAMPOO TOPICAL
Qty: 120 ML | Refills: 5 | Status: SHIPPED | OUTPATIENT
Start: 2020-11-17

## 2020-11-17 RX ORDER — FLUTICASONE PROPIONATE 50 MCG
2 SPRAY, SUSPENSION (ML) NASAL DAILY
Qty: 3 BOTTLE | Refills: 1 | Status: SHIPPED | OUTPATIENT
Start: 2020-11-17

## 2020-11-17 NOTE — PROGRESS NOTES
[facility]    CC: F/U for Chronic Disease Management    HPI:     Julio C Love, who was evaluated through a synchronous (real-time) audio-video encounter, and/or her healthcare decision maker, is aware that it is a billable service, with coverage as determined by her insurance carrier. She provided verbal consent to proceed: Yes, and patient identification was verified. It was conducted pursuant to the emergency declaration under the [address] [place] [address], [address] [place] [address] and the [employer] and [employer] Act. A caregiver was present when appropriate. Ability to conduct physical exam was limited. I was in the office. The patient was at home.       Hypothyroidism:  -Taking levothyroxine as prescribed  -Denies any side effects or issue with the medication regimen   -Reports issues with her weight      HLD:  -On no lipid lowering medication  -Diet is unchanged since last visit  -Exercising 5 days a week for 15 minutes      HTN:  -Taking BP medication as prescribed  -Denies any side effects or issue with the medication regimen   -Has not checked her BP in the past 3 weeks  -Diet is unchanged since last visit  -Exercising 5 days a week for 15 minutes      Acute Frontal Sinusitis:  -Reports issue persists  -Next month has appointment with neurologist  -Has been taking Sudafed and Flonase as prescribed  -Denies any side effects or issue with the medication regimen      ROS: Positive items marked in RED  CON: fever, chills  Cardiovascular: palpitations, CP  Resp: SOB, cough (2/2 drainage)  GI: nausea, vomiting, diarrhea  : dysuria, hematuria    Past Medical History:   Diagnosis Date    Hypertension     Hyperthyroidism     Hypothyroidism        Past Surgical History:   Procedure Laterality Date    HX HYSTERECTOMY  2010       Family History   Problem Relation Age of Onset    Hypertension Mother     Heart Disease Father Social History     Tobacco Use    Smoking status: Current Some Day Smoker     Types: Cigars    Smokeless tobacco: Never Used   Substance Use Topics    Alcohol use: Yes     Alcohol/week: 1.0 standard drinks     Types: 1 Glasses of wine per week     Comment: ocasional    Drug use: Never       Allergies   Allergen Reactions    Peanuts [Peanut Oil] Anaphylaxis         Current Outpatient Medications:     levothyroxine (SYNTHROID) 75 mcg tablet, Take 1 Tab by mouth Daily (before breakfast). Monday thru Friday, Disp: 90 Tab, Rfl: 0    levothyroxine (SYNTHROID) 88 mcg tablet, Take 1 Tab by mouth Daily (before breakfast). Saturday and Sunday, Disp: 90 Tab, Rfl: 0    pseudoephedrine (SUDAFED) 60 mg tablet, Take 1 Tab by mouth every six (6) hours as needed for Congestion. , Disp: 24 Tab, Rfl: 1    azelastine (ASTELIN) 137 mcg (0.1 %) nasal spray, 1 [place] by Both Nostrils route two (2) times a day. Use in each nostril as directed, Disp: 1 Bottle, Rfl: 5    ciclopirox (LOPROX) 1 % sham, Apply 5 mL to wet hair; lather, and leave on hair and scalp for 3 minutes; rinse. May use 10 mL for longer hair. Repeat twice weekly for 4 weeks; allow a minimum of 3 days between applications, Disp: 092 mL, Rfl: 5    fluticasone propionate (FLONASE) 50 mcg/actuation nasal spray, 2 Sprays by Both Nostrils route daily. , Disp: 1 Bottle, Rfl: 3    amLODIPine-benazepril (LOTREL) 10-40 mg per capsule, Take 1 Cap by mouth daily. , Disp: 90 Cap, Rfl: 2    malathion (OVIDE) 0.5 % lotion, Apply sufficient amount to cover and thoroughly moisten dry hair and scalp; allow hair to dry and shampoo after 8 to 12 hours.  If required, repeat with second application in 7 days, Disp: 59 mL, Rfl: 1    Physical Exam:      General: WD, WN, NAD, conversant  Eyes: sclera clear bilaterally, no discharge noted, eyelids normal in appearance, EOMI  HENT: NCAT  Neck: no masses visualized, trachea appears to be midline  Lungs: normal respiratory effort and rate, No visualized signs of difficulty breathing or respiratory distress  MS: normal AROM of neck noted  Skin: no significant exanthematous lesions or discoloration noted on neck/facial skin    Psych: alert and oriented to person, place and situation, normal affect  Neuro: speech normal, moving all upper extremities, no gaze palsy, no facial asymmetry (Cranial nerve 7 motor function) (limited exam due to video visit)      Assessment/Plan     Hypothyroidism:  -Will continue current levothyroxine regimen  -TSH ordered  -Follow-up in 3 weeks for chronic disease management      HLD:  -Will continue plan of management with lifestyle changes  -CMP and fasting lipid panel ordered  -Follow-up in 3 weeks for chronic disease management      HTN:  -Ability to assess issue limited by nature of visit type  -Will continue current BP medication  regimen  -TSH, CBC, CMP, and fasting lipid panel ordered  -Follow-up in 3 weeks for chronic disease management      Acute Frontal Sinusitis:  -Likely 2/2 inadequately controlled allergic rhinitis  -Will continue current allergy medication regimen  -Will defer any further evaluation/work up to neurology  -Need to obtain results of CT scan to see if any sinusitis was noted  -Follow-up in 3 weeks for chronic disease management      Ellen Pollard is a 54 y.o. female being evaluated by a Virtual Visit (video visit) encounter to address concerns as mentioned above. A caregiver was present when appropriate. Due to this being a TeleHealth encounter (During [place] [address] public health emergency), evaluation of the following organ systems was limited: Vitals/Constitutional/EENT/Resp/CV/GI//MS/Neuro/Skin/Heme-Lymph-Imm.   Pursuant to the emergency declaration under the [facility] waiver authority and the [employer] and [employer] Act, this Virtual Visit was conducted with patient's (and/or legal guardian's) consent, to reduce the risk of exposure to COVID-19 and provide necessary medical care. Services were provided through a video synchronous discussion virtually to substitute for in-person encounter. --Candis Murrieta MD on 11/17/2020 at 3:19 PM    An electronic signature was used to authenticate this note.

## 2020-11-17 NOTE — PROGRESS NOTES
1. Have you been to the ER, urgent care clinic since your last visit? Hospitalized since your last visit? No    2. Have you seen or consulted any other health care providers outside of the [address] [place] [address] since your last visit? Include any pap smears or colon screening.  No

## 2020-12-02 ENCOUNTER — HOSPITAL ENCOUNTER (OUTPATIENT)
Dept: LAB | Age: 55
Discharge: HOME | End: 2020-12-02
Payer: OTHER GOVERNMENT

## 2020-12-02 ENCOUNTER — APPOINTMENT (OUTPATIENT)
Dept: FAMILY MEDICINE CLINIC | Age: 55
End: 2020-12-02

## 2020-12-02 DIAGNOSIS — Z11.59 NEED FOR HEPATITIS C SCREENING TEST: ICD-10-CM

## 2020-12-02 DIAGNOSIS — I10 HYPERTENSION WITH GOAL BLOOD PRESSURE LESS THAN 140/90: ICD-10-CM

## 2020-12-02 DIAGNOSIS — E78.5 HYPERLIPIDEMIA, UNSPECIFIED HYPERLIPIDEMIA TYPE: ICD-10-CM

## 2020-12-02 DIAGNOSIS — E89.0 POSTABLATIVE HYPOTHYROIDISM: ICD-10-CM

## 2020-12-02 LAB
ALBUMIN SERPL-MCNC: 4.1 G/DL (ref 3.4–5)
ALBUMIN/GLOB SERPL: 1.2 {RATIO} (ref 0.8–1.7)
ALP SERPL-CCNC: 60 U/L (ref 45–117)
ALT SERPL-CCNC: 17 U/L (ref 13–56)
ANION GAP SERPL CALC-SCNC: 9 MMOL/L (ref 3–18)
AST SERPL-CCNC: 20 U/L (ref 10–38)
BILIRUB SERPL-MCNC: 0.3 MG/DL (ref 0.2–1)
BUN SERPL-MCNC: 8 MG/DL (ref 7–18)
BUN/CREAT SERPL: 10 (ref 12–20)
CALCIUM SERPL-MCNC: 9 MG/DL (ref 8.5–10.1)
CHLORIDE SERPL-SCNC: 108 MMOL/L (ref 100–111)
CHOLEST SERPL-MCNC: 146 MG/DL
CO2 SERPL-SCNC: 25 MMOL/L (ref 21–32)
CREAT SERPL-MCNC: 0.77 MG/DL (ref 0.6–1.3)
ERYTHROCYTE [DISTWIDTH] IN BLOOD BY AUTOMATED COUNT: 14.2 % (ref 11.6–14.5)
GLOBULIN SER CALC-MCNC: 3.3 G/DL (ref 2–4)
GLUCOSE SERPL-MCNC: 68 MG/DL (ref 74–99)
HCT VFR BLD AUTO: 40.7 % (ref 35–45)
HDLC SERPL-MCNC: 63 MG/DL (ref 40–60)
HDLC SERPL: 2.3 {RATIO} (ref 0–5)
HGB BLD-MCNC: 14 G/DL (ref 12–16)
LDLC SERPL CALC-MCNC: 60.2 MG/DL (ref 0–100)
LIPID PROFILE,FLP: ABNORMAL
MCH RBC QN AUTO: 34.1 PG (ref 24–34)
MCHC RBC AUTO-ENTMCNC: 34.4 G/DL (ref 31–37)
MCV RBC AUTO: 99 FL (ref 74–97)
PLATELET # BLD AUTO: 437 K/UL (ref 135–420)
PMV BLD AUTO: 9.1 FL (ref 9.2–11.8)
POTASSIUM SERPL-SCNC: 3.7 MMOL/L (ref 3.5–5.5)
PROT SERPL-MCNC: 7.4 G/DL (ref 6.4–8.2)
RBC # BLD AUTO: 4.11 M/UL (ref 4.2–5.3)
SODIUM SERPL-SCNC: 142 MMOL/L (ref 136–145)
TRIGL SERPL-MCNC: 114 MG/DL (ref ?–150)
TSH SERPL DL<=0.05 MIU/L-ACNC: 9.06 UIU/ML (ref 0.36–3.74)
VLDLC SERPL CALC-MCNC: 22.8 MG/DL
WBC # BLD AUTO: 8.8 K/UL (ref 4.6–13.2)

## 2020-12-02 PROCEDURE — 84443 ASSAY THYROID STIM HORMONE: CPT

## 2020-12-02 PROCEDURE — 80053 COMPREHEN METABOLIC PANEL: CPT

## 2020-12-02 PROCEDURE — 85027 COMPLETE CBC AUTOMATED: CPT

## 2020-12-02 PROCEDURE — 80061 LIPID PANEL: CPT

## 2020-12-02 PROCEDURE — 86803 HEPATITIS C AB TEST: CPT

## 2020-12-03 LAB
HCV AB SER IA-ACNC: 0.02 INDEX
HCV AB SERPL QL IA: NEGATIVE
HCV COMMENT,HCGAC: NORMAL

## 2020-12-07 ENCOUNTER — OFFICE VISIT (OUTPATIENT)
Dept: FAMILY MEDICINE CLINIC | Age: 55
End: 2020-12-07
Payer: OTHER GOVERNMENT

## 2020-12-07 VITALS
WEIGHT: 132.8 LBS | SYSTOLIC BLOOD PRESSURE: 128 MMHG | HEIGHT: 64 IN | TEMPERATURE: 98.4 F | DIASTOLIC BLOOD PRESSURE: 88 MMHG | OXYGEN SATURATION: 100 % | HEART RATE: 83 BPM | RESPIRATION RATE: 18 BRPM | BODY MASS INDEX: 22.67 KG/M2

## 2020-12-07 DIAGNOSIS — E03.9 HYPOTHYROIDISM, UNSPECIFIED TYPE: Primary | ICD-10-CM

## 2020-12-07 DIAGNOSIS — I10 HYPERTENSION WITH GOAL BLOOD PRESSURE LESS THAN 140/90: ICD-10-CM

## 2020-12-07 DIAGNOSIS — E78.5 HYPERLIPIDEMIA, UNSPECIFIED HYPERLIPIDEMIA TYPE: ICD-10-CM

## 2020-12-07 PROCEDURE — 99214 OFFICE O/P EST MOD 30 MIN: CPT | Performed by: FAMILY MEDICINE

## 2020-12-07 RX ORDER — LEVOTHYROXINE SODIUM 100 UG/1
100 TABLET ORAL
Qty: 30 TAB | Refills: 5 | Status: SHIPPED | OUTPATIENT
Start: 2020-12-07

## 2020-12-07 NOTE — PROGRESS NOTES
[facility]    CC: F/U for Chronic Disease Management    HPI:     HTN:  -Got requested blood work  -Taking BP medication as prescribed  -Denies any side effects or issue with the medication regimen  -Checking BP at home  -Reports her BP has been in the 120s/80s  -Exercising for 10-15 minutes daily  -Diet is unchanged from last visit      HLD:  -Got requested blood work  -On no lipid lowering medication  -Exercising for 10-15 minutes daily  -Diet is unchanged from last visit      Hypothyroidism:  -Got requested lab work  -Taking levothyroxine as prescribed  -Denies any side effects or issue with the medication regimen      ROS: Positive items marked in RED  CON: fever, chills  Cardiovascular: palpitations, CP  Resp: SOB, cough  GI: nausea, vomiting, diarrhea  : dysuria, hematuria      Past Medical History:   Diagnosis Date    Hypertension     Hyperthyroidism     Hypothyroidism        Past Surgical History:   Procedure Laterality Date    HX HYSTERECTOMY  2010       Family History   Problem Relation Age of Onset    Hypertension Mother     Heart Disease Father        Social History     Tobacco Use    Smoking status: Current Some Day Smoker     Types: Cigars    Smokeless tobacco: Never Used   Substance Use Topics    Alcohol use: Yes     Alcohol/week: 1.0 standard drinks     Types: 1 Glasses of wine per week     Comment: ocasional    Drug use: Never       Allergies   Allergen Reactions    Peanuts [Peanut Oil] Anaphylaxis         Current Outpatient Medications:     ciclopirox (LOPROX) 1 % sham, Apply 5 mL to wet hair; lather, and leave in hair/scalp for 3 minutes. Use twice weekly for 4 weeks; allow at least of 3 days between uses, Disp: 120 mL, Rfl: 5    fluticasone propionate (FLONASE) 50 mcg/actuation nasal spray, 2 Sprays by Both Nostrils route daily. , Disp: 3 Bottle, Rfl: 1    amLODIPine-benazepril (LOTREL) 10-40 mg per capsule, Take 1 Cap by mouth daily. , Disp: 90 Cap, Rfl: 2   pseudoephedrine (SUDAFED) 60 mg tablet, Take 1 Tab by mouth every six (6) hours as needed for Congestion. , Disp: 24 Tab, Rfl: 1    azelastine (ASTELIN) 137 mcg (0.1 %) nasal spray, 1 [place] by Both Nostrils route two (2) times a day. Use in each nostril as directed, Disp: 1 Bottle, Rfl: 5    malathion (OVIDE) 0.5 % lotion, Apply sufficient amount to cover and thoroughly moisten dry hair and scalp; allow hair to dry and shampoo after 8 to 12 hours. If required, repeat with second application in 7 days, Disp: 59 mL, Rfl: 1    Physical Exam:      /88   Pulse 83   Temp 98.4 °F (36.9 °C) (Temporal)   Resp 18   Ht 5' 4\" (1.626 m)   Wt 132 lb 12.8 oz (60.2 kg)   SpO2 100%   BMI 22.80 kg/m²     General:  WD, WN, NAD, conversant  Eyes: sclera clear bilaterally, no discharge noted, eyelids normal in appearance  HENT: NCAT  Lungs: CTAB, normal respiratory effort and rate  CV: RRR, no MRGs  ABD: soft, non-tender, non-distended, normal bowel sounds  Ext: no peripheral edema or digital cyanosis noted  Skin: normal temperature, turgor, color, and texture  Psych: alert and oriented to person, place and situation, normal affect  Neuro: speech normal, moving all extremities, gait normal    Results for Krystal Hager (MRN [id number]):   Ref.  Range 12/2/2020 08:45   WBC Latest Ref Range: 4.6 - 13.2 K/uL 8.8   RBC Latest Ref Range: 4.20 - 5.30 M/uL 4.11 (L)   HGB Latest Ref Range: 12.0 - 16.0 g/dL 14.0   HCT Latest Ref Range: 35.0 - 45.0 % 40.7   MCV Latest Ref Range: 74.0 - 97.0 FL 99.0 (H)   MCH Latest Ref Range: 24.0 - 34.0 PG 34.1 (H)   MCHC Latest Ref Range: 31.0 - 37.0 g/dL 34.4   RDW Latest Ref Range: 11.6 - 14.5 % 14.2   PLATELET Latest Ref Range: 135 - 420 K/uL 437 (H)   MPV Latest Ref Range: 9.2 - 11.8 FL 9.1 (L)   Sodium Latest Ref Range: 136 - 145 mmol/L 142   Potassium Latest Ref Range: 3.5 - 5.5 mmol/L 3.7   Chloride Latest Ref Range: 100 - 111 mmol/L 108   CO2 Latest Ref Range: 21 - 32 mmol/L 25   Anion gap Latest Ref Range: 3.0 - 18 mmol/L 9   Glucose Latest Ref Range: 74 - 99 mg/dL 68 (L)   BUN Latest Ref Range: 7.0 - 18 MG/DL 8   Creatinine Latest Ref Range: 0.6 - 1.3 MG/DL 0.77   BUN/Creatinine ratio Latest Ref Range: 12 - 20   10 (L)   Calcium Latest Ref Range: 8.5 - 10.1 MG/DL 9.0   GFR est non-AA Latest Ref Range: >60 ml/min/1.73m2 >60   GFR est AA Latest Ref Range: >60 ml/min/1.73m2 >60   Bilirubin, total Latest Ref Range: 0.2 - 1.0 MG/DL 0.3   Protein, total Latest Ref Range: 6.4 - 8.2 g/dL 7.4   Albumin Latest Ref Range: 3.4 - 5.0 g/dL 4.1   Globulin Latest Ref Range: 2.0 - 4.0 g/dL 3.3   A-G Ratio Latest Ref Range: 0.8 - 1.7   1.2   ALT Latest Ref Range: 13 - 56 U/L 17   AST Latest Ref Range: 10 - 38 U/L 20   Alk.  phosphatase Latest Ref Range: 45 - 117 U/L 60   Triglyceride Latest Ref Range: <150 MG/   Cholesterol, total Latest Ref Range: <200 MG/   HDL Cholesterol Latest Ref Range: 40 - 60 MG/DL 63 (H)   CHOL/HDL Ratio Latest Ref Range: 0 - 5.0   2.3   VLDL, calculated Latest Units: MG/DL 22.8   LDL, calculated Latest Ref Range: 0 - 100 MG/DL 60.2   TSH Latest Ref Range: 0.36 - 3.74 uIU/mL 9.06 (H)   Hepatitis C virus Ab Latest Ref Range: <0.80 Index 0.02   Hep C  virus Ab comment Latest Units:   Pend       Assessment/Plan     Hypothyroidism, inadequately controlled:  -Levothyroxine regimen changed to 100 mg daily  -TSH ordered to be checked prior to next visit  -Follow-up in 4 weeks       HTN, well controlled:  -BP at goal of <140/90  -Will continue current BP medication regimen  -Follow-up in 4 weeks      HLD:  -Elevated LDL and total cholesterol have resolved  -Currently no indication to start on statin therapy  -Encouraged to continue working on recommended lifestyle changes  -Follow-up in 4 weeks        Lilian Talamantes MD  12/7/2020, 3:34 PM

## 2020-12-07 NOTE — PROGRESS NOTES
Chief Complaint   Patient presents with    Follow-up     HTN, Cholesterol     1. Have you been to the ER, urgent care clinic since your last visit? Hospitalized since your last visit? No    2. Have you seen or consulted any other health care providers outside of the [address] [place] [address] since your last visit? Include any pap smears or colon screening. Yes Where: Neurology consult-Scheduled for sleep study     .